# Patient Record
Sex: FEMALE | Race: AMERICAN INDIAN OR ALASKA NATIVE | Employment: OTHER | URBAN - NONMETROPOLITAN AREA
[De-identification: names, ages, dates, MRNs, and addresses within clinical notes are randomized per-mention and may not be internally consistent; named-entity substitution may affect disease eponyms.]

---

## 2021-06-27 ENCOUNTER — HOSPITAL ENCOUNTER (EMERGENCY)
Facility: HOSPITAL | Age: 61
Discharge: HOME | End: 2021-06-27
Attending: NURSE PRACTITIONER | Admitting: NURSE PRACTITIONER
Payer: MEDICARE

## 2021-06-27 VITALS — TEMPERATURE: 98.7 F | HEART RATE: 80 BPM | OXYGEN SATURATION: 97 % | RESPIRATION RATE: 16 BRPM

## 2021-06-27 DIAGNOSIS — H92.02 OTALGIA, LEFT: ICD-10-CM

## 2021-06-27 DIAGNOSIS — R50.9 FEVER: ICD-10-CM

## 2021-06-27 DIAGNOSIS — J01.90 ACUTE SINUSITIS: ICD-10-CM
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
## 2021-06-27 DIAGNOSIS — S20.362A TICK BITE OF LEFT SIDE OF CHEST WALL, INITIAL ENCOUNTER: ICD-10-CM

## 2021-06-27 DIAGNOSIS — L03.313 CELLULITIS OF CHEST WALL: Primary | ICD-10-CM

## 2021-06-27 PROCEDURE — 99214 OFFICE O/P EST MOD 30 MIN: CPT | Performed by: NURSE PRACTITIONER

## 2021-06-27 PROCEDURE — G0463 HOSPITAL OUTPT CLINIC VISIT: HCPCS

## 2021-06-27 RX ORDER — DOXYCYCLINE HYCLATE 100 MG
100 TABLET ORAL 2 TIMES DAILY
Qty: 28 TABLET | Refills: 0 | Status: SHIPPED | OUTPATIENT
Start: 2021-06-27 | End: 2021-07-11

## 2021-06-27 ASSESSMENT — ENCOUNTER SYMPTOMS
DIZZINESS: 0
COUGH: 0
SINUS PAIN: 0
HEADACHES: 1
SHORTNESS OF BREATH: 0
LIGHT-HEADEDNESS: 0
APPETITE CHANGE: 0
NAUSEA: 0
WOUND: 1
DIARRHEA: 0
VOMITING: 0
WEAKNESS: 0
FEVER: 1
SINUS PRESSURE: 0
NUMBNESS: 0
COLOR CHANGE: 1
MYALGIAS: 1

## 2021-06-27 NOTE — DISCHARGE INSTRUCTIONS
Take antibiotic as prescribed until finished.    Take Tylenol or ibuprofen as needed for pain or fever.    Follow-up with your doctor as needed if no improvement in your symptoms.    Return to emergency department for any concerning symptoms.

## 2021-06-27 NOTE — ED PROVIDER NOTES
History     Chief Complaint   Patient presents with     Sinusitis     Fever     Tick Bite     HPI  Laurence Dangelo is a 61 year old female who presents ambulatory to urgent care for multiple complaints. Patient tells me that she has had a frontal headache, left ear pain, generalized body aches and fever for 5 days. Fevers of up to   F. Reports history of seasonal allergies and has been taking her allergy medication and using nasal sprays with minimal improvements. Headache is primarily to the left side of her head. Additionally, patient notes that she has a reddened area to the left side of her chest. 3 weeks ago she pulled of a wood tick that she believes was only attached for a few hours. She notes that the area was healing up well. Sometime in the middle of the night she scratched to the area and now she thinks it is infected.    Denies shortness of breath, chest pain, nausea, vomiting or diarrhea. No rash to the rest of her body.    Patient declined to have a blood pressure taken during this visit.    Allergies:  Allergies   Allergen Reactions     Ciprofloxacin      Rash       Ether      nausea     Ezetimibe Other (See Comments)     Muscle aches and weak     Neomycin-Bacitracin-Polymyxin      Topical neosporin     Rosuvastatin Muscle Pain (Myalgia)     Pravastatin, lovastatin, atorvastatin, simvastatin also troubles       Problem List:    There are no active problems to display for this patient.       Past Medical History:    Past Medical History:   Diagnosis Date     Cervical cancer (H)      Diabetes type 2, controlled (H)      Lymphedema of left leg        Past Surgical History:    No past surgical history on file.    Family History:    No family history on file.    Social History:  Marital Status:   [2]  Social History     Tobacco Use     Smoking status: Never Smoker   Substance Use Topics     Alcohol use: Yes     Comment: rare     Drug use: No        Medications:    doxycycline hyclate  (VIBRA-TABS) 100 MG tablet  ASPIRIN PO  calcium citrate-vitamin D (CITRACAL) 315-200 MG-UNIT TABS  HYDROcodone-acetaminophen (NORCO) 5-325 MG per tablet  LISINOPRIL PO  METFORMIN HCL PO  multivitamin, therapeutic with minerals (MULTI-VITAMIN) TABS  NIACIN CR PO  Omega-3 Fatty Acids (OMEGA-3 FISH OIL PO)          Review of Systems   Constitutional: Positive for fever. Negative for appetite change.   HENT: Positive for ear pain. Negative for ear discharge, sinus pressure and sinus pain.    Eyes: Negative for visual disturbance.   Respiratory: Negative for cough and shortness of breath.    Gastrointestinal: Negative for diarrhea, nausea and vomiting.   Musculoskeletal: Positive for myalgias.   Skin: Positive for color change and wound.   Neurological: Positive for headaches. Negative for dizziness, weakness, light-headedness and numbness.   All other systems reviewed and are negative.      Physical Exam   BP: (pt refused BP)  Pulse: 80  Temp: 98.7  F (37.1  C)  Resp: 16  SpO2: 97 %      Physical Exam  Vitals signs and nursing note reviewed.   Constitutional:       Appearance: Normal appearance. She is not ill-appearing or toxic-appearing.   HENT:      Head: Normocephalic and atraumatic.      Right Ear: Tympanic membrane and ear canal normal.      Nose: Nose normal.      Mouth/Throat:      Mouth: Mucous membranes are moist.   Eyes:      Extraocular Movements: Extraocular movements intact.      Pupils: Pupils are equal, round, and reactive to light.   Neck:      Musculoskeletal: Normal range of motion.   Cardiovascular:      Rate and Rhythm: Normal rate and regular rhythm.      Heart sounds: Normal heart sounds.   Pulmonary:      Effort: Pulmonary effort is normal. No respiratory distress.      Breath sounds: Normal breath sounds.   Musculoskeletal: Normal range of motion.   Skin:     General: Skin is warm.      Capillary Refill: Capillary refill takes less than 2 seconds.      Findings: Erythema present.              Comments: Approximately 3 x 1 inch erythematous area to the left side of the chest. No central clearing. No fluctuance.   Neurological:      General: No focal deficit present.      Mental Status: She is alert and oriented to person, place, and time.      GCS: GCS eye subscore is 4. GCS verbal subscore is 5. GCS motor subscore is 6.      Cranial Nerves: No cranial nerve deficit, dysarthria or facial asymmetry.      Sensory: Sensation is intact. No sensory deficit.      Motor: No weakness or pronator drift.      Coordination: Coordination is intact. Coordination normal.      Gait: Gait normal.         ED Course        Procedures               No results found for this or any previous visit (from the past 24 hour(s)).    Medications - No data to display    Assessments & Plan (with Medical Decision Making)     I have reviewed the nursing notes.    I have reviewed the findings, diagnosis, plan and need for follow up with the patient.    New Prescriptions    DOXYCYCLINE HYCLATE (VIBRA-TABS) 100 MG TABLET    Take 1 tablet (100 mg) by mouth 2 times daily for 14 days       Final diagnoses:   Cellulitis of chest wall   Tick bite of left side of chest wall, initial encounter   Acute sinusitis   Otalgia, left   Fever   Offered to test for Lyme disease or tick borne illness testing, patient declined. Patient will be treated with doxycycline which should cover for cellulitis of her chest wall, tick bite and sinusitis. Recommended she continue taking Tylenol/ibuprofen as needed for pain or fever. Close follow-up with PCP if no improvement in symptoms. Return to ED/UC for any concerning symptoms. Patient verbalized understanding.    This document was prepared using a combination of typing and voice generated software.  While every attempt was made for accuracy, spelling and grammatical errors may exist.    6/27/2021   [facility]     Mpofu, Prudence, CNP  06/30/21 [id number]

## 2021-06-27 NOTE — ED TRIAGE NOTES
Pt presents low grade fever since Wednesday night. Left ear pain and sinus pressure.  Had tic bite 3 weeks ago and healed up and pt scratched and now the area is reddened.

## 2022-06-24 ENCOUNTER — HOSPITAL ENCOUNTER (EMERGENCY)
Facility: HOSPITAL | Age: 62
Discharge: HOME | End: 2022-06-24
Attending: NURSE PRACTITIONER | Admitting: NURSE PRACTITIONER
Payer: MEDICARE

## 2022-06-24 VITALS
DIASTOLIC BLOOD PRESSURE: 86 MMHG | RESPIRATION RATE: 18 BRPM | TEMPERATURE: 98.7 F | OXYGEN SATURATION: 97 % | HEART RATE: 80 BPM | SYSTOLIC BLOOD PRESSURE: 151 MMHG

## 2022-06-24 DIAGNOSIS — L03.116 CELLULITIS OF LEFT LOWER EXTREMITY: ICD-10-CM

## 2022-06-24 PROCEDURE — 99213 OFFICE O/P EST LOW 20 MIN: CPT | Performed by: NURSE PRACTITIONER

## 2022-06-24 PROCEDURE — G0463 HOSPITAL OUTPT CLINIC VISIT: HCPCS

## 2022-06-24 RX ORDER — CEPHALEXIN 500 MG/1
500 CAPSULE ORAL 4 TIMES DAILY
Qty: 20 CAPSULE | Refills: 0 | Status: SHIPPED | OUTPATIENT
Start: 2022-06-24 | End: 2022-06-29

## 2022-06-24 ASSESSMENT — ENCOUNTER SYMPTOMS
FATIGUE: 1
LIGHT-HEADEDNESS: 0
SHORTNESS OF BREATH: 0
DIARRHEA: 1
APPETITE CHANGE: 1
HEADACHES: 0
CHILLS: 1
ACTIVITY CHANGE: 1
COLOR CHANGE: 1
DIZZINESS: 0
FEVER: 1
MYALGIAS: 1

## 2022-06-24 NOTE — DISCHARGE INSTRUCTIONS
-Increase fluids.   -Complete all antibiotics even if feeling better.    -Taking antibiotics with food may decrease the symptoms, of an upset stomach, that can occur when taking antibiotics. Antibiotics frequently cause diarrhea. Probiotics or yogurt may help prevent or decrease these symptoms.   -Return to be reevaluated if symptoms do not improve, or worsen.    Return to ER or call 911 for shortness of breath, chest pain, or development of stroke like symptoms (facial droop, unexpected or increased weakness, confusion, or difficulty with speech).

## 2022-06-24 NOTE — ED TRIAGE NOTES
Patient has redness on her left leg, by the knee. Redness started last night. Achy, fever. States typical signs of cellulitis, for her

## 2022-06-24 NOTE — ED PROVIDER NOTES
History     Chief Complaint   Patient presents with     cellulitus     HPI  Laurence Dangelo is a 61 year old female who presents with left lower leg aching and redness that started 4 days ago.  Accompanied with decreased appetite, chills, fatigue, fever, and diarrhea that has now subsided.  Had a tick bite 2 weeks ago and was treated with doxycycline, at that time.  Was told to watch for cellulitis as has a history of cellulitis and cellulitis from tick bites.  Took ibuprofen at 2 AM this morning with mild relief of discomfort.  Quit smoking 1988.  Does not feel this is a blood clot as she has a history of cellulitis and this feels the same.  Denies nausea, vomiting, shortness of breath, headaches, dizziness, and lightheadedness.    Allergies:  Allergies   Allergen Reactions     Ciprofloxacin      Rash       Ether      nausea     Ezetimibe Other (See Comments)     Muscle aches and weak     Neomycin-Bacitracin-Polymyxin      Topical neosporin     Rosuvastatin Muscle Pain (Myalgia)     Pravastatin, lovastatin, atorvastatin, simvastatin also troubles       Problem List:    There are no problems to display for this patient.       Past Medical History:    Past Medical History:   Diagnosis Date     Cervical cancer (H)      Diabetes type 2, controlled (H)      Lymphedema of left leg        Past Surgical History:    History reviewed. No pertinent surgical history.    Family History:    History reviewed. No pertinent family history.    Social History:  Marital Status:   [2]  Social History     Tobacco Use     Smoking status: Never Smoker     Smokeless tobacco: Never Used   Substance Use Topics     Alcohol use: Yes     Comment: rare     Drug use: No        Medications:    ASPIRIN PO  cephALEXin (KEFLEX) 500 MG capsule  LISINOPRIL PO  METFORMIN HCL PO  multivitamin w/minerals (THERA-VIT-M) tablet  Omega-3 Fatty Acids (OMEGA-3 FISH OIL PO)          Review of Systems   Constitutional: Positive for activity change,  appetite change, chills, fatigue and fever.   Respiratory: Negative for shortness of breath.    Gastrointestinal: Positive for diarrhea (none in past 24 hours).   Musculoskeletal: Positive for myalgias.        Left lower leg aching and red and swelling. Has lymphedema in left leg    Skin: Positive for color change.   Neurological: Negative for dizziness, light-headedness and headaches.       Physical Exam   BP: 151/86  Pulse: 80  Temp: 98.7  F (37.1  C)  Resp: 18  SpO2: 97 %      Physical Exam  Vitals and nursing note reviewed.   Constitutional:       General: She is in acute distress (Mild to moderate).      Appearance: She is overweight.   Cardiovascular:      Rate and Rhythm: Normal rate and regular rhythm.      Pulses:           Dorsalis pedis pulses are 3+ on the left side.        Posterior tibial pulses are 3+ on the left side.      Heart sounds: Normal heart sounds. No murmur heard.  Pulmonary:      Effort: Pulmonary effort is normal. No respiratory distress.      Breath sounds: Normal breath sounds. No stridor. No wheezing, rhonchi or rales.   Musculoskeletal:         General: Swelling (Mild left lower leg) and tenderness present.        Legs:    Feet:      Left foot:      Skin integrity: Skin integrity normal.   Skin:     General: Skin is warm and dry.      Capillary Refill: Capillary refill takes less than 2 seconds.      Findings: Erythema present.   Neurological:      Mental Status: She is alert and oriented to person, place, and time.         ED Course                 Procedures           No results found for this or any previous visit (from the past 24 hour(s)).    Medications - No data to display    Assessments & Plan (with Medical Decision Making)     I have reviewed the nursing notes.    I have reviewed the findings, diagnosis, plan and need for follow up with the patient.  (L03.116) Cellulitis of left lower extremity  Comment: 61 year old female who presents with left lower leg aching and redness  that started 4 days ago.  Accompanied with decreased appetite, chills, fatigue, fever, and diarrhea that has now subsided.  Had a tick bite 2 weeks ago and was treated with doxycycline, at that time.  Was told to watch for cellulitis as has a history of cellulitis and cellulitis from tick bites.  Took ibuprofen at 2 AM this morning with mild relief of discomfort.  Quit smoking 1988.  Does not feel this is a blood clot as she has a history of cellulitis and this feels the same.  Denies nausea, vomiting, shortness of breath, headaches, dizziness, and lightheadedness.    MDM: NHT. Lungs CTA  Left, lateral, proximal, lower leg has 11 x 11 cm region of erythema.  Has lymphedema in this leg and is always swollen.  Is been mildly larger than right lower leg.  Upper leg is mottled and she states this is normal for her.  Popliteal pulse obtained with Doppler.  Pedal pulses 3+.    Does not wish to have ultrasound completed at this time as has history of cellulitis and lymphedema left leg.    Plan: Cephalexin 4 times daily for 5 days.  Education provided and/or discussed for this/these medication and cellulitis.  -Increase fluids.   -Complete all antibiotics even if feeling better.    -Taking antibiotics with food may decrease the symptoms, of an upset stomach, that can occur when taking antibiotics. Antibiotics frequently cause diarrhea. Probiotics or yogurt may help prevent or decrease these symptoms.   -Return to be reevaluated if symptoms do not improve, or worsen.    Return to ER or call 911 for shortness of breath, chest pain, or development of stroke like symptoms (facial droop, unexpected or increased weakness, confusion, or difficulty with speech).  These discharge instructions and medications were reviewed with her and understanding verbalized.    This document was prepared using a combination of typing and voice generated software.  While every attempt was made for accuracy, spelling and grammatical errors may  exist.    Discharge Medication List as of 6/24/2022 10:33 AM      START taking these medications    Details   cephALEXin (KEFLEX) 500 MG capsule Take 1 capsule (500 mg) by mouth 4 times daily for 5 days, Disp-20 capsule, R-0, E-Prescribe             Final diagnoses:   Cellulitis of left lower extremity       6/24/2022   [facility]       Gisella Wheat, KYLAH  06/24/22 [id number]

## 2022-06-24 NOTE — ED TRIAGE NOTES
Pt presents with redness to her left lower leg and is making her left thigh feel overworked. She thinks this started on Wednesday.

## 2022-12-04 ENCOUNTER — HOSPITAL ENCOUNTER (EMERGENCY)
Facility: HOSPITAL | Age: 62
Discharge: HOME | End: 2022-12-04
Attending: NURSE PRACTITIONER | Admitting: NURSE PRACTITIONER
Payer: MEDICARE

## 2022-12-04 VITALS
DIASTOLIC BLOOD PRESSURE: 82 MMHG | RESPIRATION RATE: 16 BRPM | WEIGHT: 246.91 LBS | OXYGEN SATURATION: 97 % | HEART RATE: 61 BPM | SYSTOLIC BLOOD PRESSURE: 154 MMHG | TEMPERATURE: 97.3 F

## 2022-12-04 DIAGNOSIS — L03.116 CELLULITIS OF LEFT LOWER LEG: Primary | ICD-10-CM

## 2022-12-04 PROCEDURE — 99213 OFFICE O/P EST LOW 20 MIN: CPT | Performed by: NURSE PRACTITIONER

## 2022-12-04 PROCEDURE — G0463 HOSPITAL OUTPT CLINIC VISIT: HCPCS

## 2022-12-04 RX ORDER — CEPHALEXIN 500 MG/1
500 CAPSULE ORAL 4 TIMES DAILY
Qty: 28 CAPSULE | Refills: 0 | Status: SHIPPED | OUTPATIENT
Start: 2022-12-04 | End: 2022-12-11

## 2022-12-04 ASSESSMENT — ENCOUNTER SYMPTOMS
MYALGIAS: 0
PSYCHIATRIC NEGATIVE: 1
DIARRHEA: 0
FEVER: 1
COLOR CHANGE: 1
CHILLS: 1
SHORTNESS OF BREATH: 0
VOMITING: 0
ARTHRALGIAS: 0
NAUSEA: 0

## 2022-12-04 NOTE — DISCHARGE INSTRUCTIONS
Cephalexin as ordered  - Take entire course of antibiotic even if you start to feel better.  - Antibiotics can cause stomach upset including nausea and diarrhea. Read your bottle or ask the pharmacist if antibiotic can be taken with food to help prevent nausea. If you have symptoms of diarrhea you can take an over-the-counter probiotic and/or increase foods with probiotics such as yogurt, [place], sauerkraut.    Alternate tylenol and ibuprofen as needed for pain    Follow up with primary care provider or return to Urgent Care/ED with any worsening in condition or additional concerns.

## 2022-12-04 NOTE — ED TRIAGE NOTES
Patient presents to urgent care with c/o cellulitis on her lower left leg. States it started on Thursday. Also has been having low grade fevers and chills. Redness stated this morning. Took tylenol at 2 am. Warm to the touch and states she has a history of this happening. Pain of 5/10 right now.

## 2022-12-04 NOTE — ED TRIAGE NOTES
61 y/o female presents with reports of erythema to her left lower leg, which began on Thursday. She also reports subjective fevers and chills.

## 2022-12-04 NOTE — ED PROVIDER NOTES
"  History     Chief Complaint   Patient presents with     Cellulitis     HPI  Laurence Dangelo is a 62 year old female who presents to urgent care today with complaints of cellulitis to left lower extremity.  Patient states started Thursday.  History of cellulitis to left lower extremity, last occurrence being on 6/24/2022 and was treated with cephalexin, patient states cephalexin works well for cellulitis.  Pain currently 5/10, took tylenol at 0200 for pain which helped.  When asked about a fever patient states that her leg feels feverish, red and warm to the touch.  No other concerns.    Allergies:  Allergies   Allergen Reactions     Ciprofloxacin      Rash       Ether      nausea     Ezetimibe Other (See Comments)     Muscle aches and weak     Neomycin-Bacitracin-Polymyxin      Topical neosporin     Rosuvastatin Muscle Pain (Myalgia)     Pravastatin, lovastatin, atorvastatin, simvastatin also troubles       Problem List:    There are no problems to display for this patient.       Past Medical History:    Past Medical History:   Diagnosis Date     Cervical cancer (H)      Diabetes type 2, controlled (H)      Lymphedema of left leg        Past Surgical History:    No past surgical history on file.    Family History:    No family history on file.    Social History:  Marital Status:   [2]  Social History     Tobacco Use     Smoking status: Never     Smokeless tobacco: Never   Substance Use Topics     Alcohol use: Yes     Comment: rare     Drug use: No        Medications:    ASPIRIN PO  cephALEXin (KEFLEX) 500 MG capsule  LISINOPRIL PO  METFORMIN HCL PO  multivitamin w/minerals (THERA-VIT-M) tablet  Omega-3 Fatty Acids (OMEGA-3 FISH OIL PO)      Review of Systems   Constitutional: Positive for chills (denies currently) and fever (\"leg feels feverish\").   Respiratory: Negative for shortness of breath.    Cardiovascular: Negative for chest pain.   Gastrointestinal: Negative for diarrhea, nausea and vomiting. "   Musculoskeletal: Negative for arthralgias, gait problem and myalgias.   Skin: Positive for color change (cellulitis left lower extremity).   Psychiatric/Behavioral: Negative.      Physical Exam   BP: 154/82  Pulse: 61  Temp: 97.3  F (36.3  C)  Resp: 16  Weight: 112 kg (246 lb 14.6 oz)  SpO2: 97 %    Physical Exam  Vitals and nursing note reviewed.   Constitutional:       General: She is not in acute distress.     Appearance: Normal appearance. She is not ill-appearing or toxic-appearing.   Cardiovascular:      Rate and Rhythm: Normal rate and regular rhythm.      Pulses: Normal pulses.      Heart sounds: Normal heart sounds.   Pulmonary:      Effort: Pulmonary effort is normal.      Breath sounds: Normal breath sounds.   Musculoskeletal:      Left knee: Normal.      Left lower leg: Tenderness present. No swelling, deformity, lacerations or bony tenderness. No edema.      Left ankle: Normal.      Left foot: Normal.   Skin:     General: Skin is warm and dry.      Capillary Refill: Capillary refill takes less than 2 seconds.      Findings: Erythema (cellulitis to left lower extremity which is red and warm to the touch.  No open skin wounds.  No signs of abscess.  Area outlined in surgical pen.) present. No abscess, bruising, ecchymosis, rash or wound.   Neurological:      Mental Status: She is alert.   Psychiatric:         Mood and Affect: Mood normal.           ED Course     No results found for this or any previous visit (from the past 24 hour(s)).    Medications - No data to display    Assessments & Plan (with Medical Decision Making)     I have reviewed the nursing notes.    I have reviewed the findings, diagnosis, plan and need for follow up with the patient.  (L03.116) Cellulitis of left lower leg  (primary encounter diagnosis)  Plan:   Patient ambulatory with a nontoxic appearance.  Cellulitis to left lower extremity.  No signs of abscess.  No open skin wounds.  History of cellulitis and cephalexin has worked  well.  Outlined area in surgical pen.  Cephalexin ordered.  Alternate Tylenol and ibuprofen as needed for pain.  Follow-up with primary care provider or return to urgent care/ED with any worsening in condition or additional concerns.  Patient is in agreement with treatment plan.    Discharge Medication List as of 12/4/2022 10:21 AM      START taking these medications    Details   cephALEXin (KEFLEX) 500 MG capsule Take 1 capsule (500 mg) by mouth 4 times daily for 7 days, Disp-28 capsule, R-0, E-Prescribe           Final diagnoses:   Cellulitis of left lower leg     12/4/2022   [facility]     Sejal Funes, DALE  12/04/22 1043

## 2025-04-29 ENCOUNTER — APPOINTMENT (OUTPATIENT)
Dept: ULTRASOUND IMAGING | Facility: HOSPITAL | Age: 65
End: 2025-04-29
Attending: EMERGENCY MEDICINE
Payer: MEDICARE

## 2025-04-29 ENCOUNTER — HOSPITAL ENCOUNTER (EMERGENCY)
Facility: HOSPITAL | Age: 65
Discharge: OTHER INSTITUTION | End: 2025-04-29
Attending: EMERGENCY MEDICINE
Payer: MEDICARE

## 2025-04-29 ENCOUNTER — APPOINTMENT (OUTPATIENT)
Dept: CT IMAGING | Facility: HOSPITAL | Age: 65
End: 2025-04-29
Attending: EMERGENCY MEDICINE
Payer: MEDICARE

## 2025-04-29 VITALS
OXYGEN SATURATION: 93 % | DIASTOLIC BLOOD PRESSURE: 78 MMHG | RESPIRATION RATE: 29 BRPM | TEMPERATURE: 97.5 F | HEART RATE: 68 BPM | SYSTOLIC BLOOD PRESSURE: 189 MMHG

## 2025-04-29 DIAGNOSIS — R60.0 PERIPHERAL EDEMA: ICD-10-CM

## 2025-04-29 DIAGNOSIS — J81.0 ACUTE PULMONARY EDEMA (H): ICD-10-CM

## 2025-04-29 DIAGNOSIS — R33.9 URINARY RETENTION WITH INCOMPLETE BLADDER EMPTYING: ICD-10-CM

## 2025-04-29 DIAGNOSIS — N31.9 NEUROGENIC BLADDER: ICD-10-CM

## 2025-04-29 DIAGNOSIS — N17.9 ACUTE KIDNEY INJURY: ICD-10-CM

## 2025-04-29 DIAGNOSIS — N13.30 BILATERAL HYDRONEPHROSIS: ICD-10-CM

## 2025-04-29 DIAGNOSIS — J90 PLEURAL EFFUSION ON RIGHT: ICD-10-CM

## 2025-04-29 LAB
ALBUMIN SERPL BCG-MCNC: 3.3 G/DL (ref 3.5–5.2)
ALBUMIN UR-MCNC: NEGATIVE MG/DL
ALP SERPL-CCNC: 84 U/L (ref 40–150)
ALT SERPL W P-5'-P-CCNC: 9 U/L (ref 0–50)
ANION GAP SERPL CALCULATED.3IONS-SCNC: 18 MMOL/L (ref 7–15)
APPEARANCE UR: CLEAR
AST SERPL W P-5'-P-CCNC: 20 U/L (ref 0–45)
BASE EXCESS BLDV CALC-SCNC: -4.5 MMOL/L (ref -3–3)
BASOPHILS # BLD AUTO: 0.1 10E3/UL (ref 0–0.2)
BASOPHILS NFR BLD AUTO: 1 %
BILIRUB SERPL-MCNC: 0.4 MG/DL
BILIRUB UR QL STRIP: NEGATIVE
BUN SERPL-MCNC: 52.1 MG/DL (ref 8–23)
CALCIUM SERPL-MCNC: 9.5 MG/DL (ref 8.8–10.4)
CHLORIDE SERPL-SCNC: 102 MMOL/L (ref 98–107)
COLOR UR AUTO: ABNORMAL
CREAT SERPL-MCNC: 5.43 MG/DL (ref 0.51–0.95)
CRP SERPL-MCNC: 172.53 MG/L
EGFRCR SERPLBLD CKD-EPI 2021: 8 ML/MIN/1.73M2
EOSINOPHIL # BLD AUTO: 0.2 10E3/UL (ref 0–0.7)
EOSINOPHIL NFR BLD AUTO: 2 %
ERYTHROCYTE [DISTWIDTH] IN BLOOD BY AUTOMATED COUNT: 14 % (ref 10–15)
GLUCOSE SERPL-MCNC: 161 MG/DL (ref 70–99)
GLUCOSE UR STRIP-MCNC: NEGATIVE MG/DL
HCO3 BLDV-SCNC: 21 MMOL/L (ref 21–28)
HCO3 SERPL-SCNC: 16 MMOL/L (ref 22–29)
HCT VFR BLD AUTO: 33 % (ref 35–47)
HGB BLD-MCNC: 11.1 G/DL (ref 11.7–15.7)
HGB UR QL STRIP: NEGATIVE
IMM GRANULOCYTES # BLD: 0.1 10E3/UL
IMM GRANULOCYTES NFR BLD: 1 %
KETONES UR STRIP-MCNC: NEGATIVE MG/DL
LACTATE SERPL-SCNC: 1.2 MMOL/L (ref 0.7–2)
LEUKOCYTE ESTERASE UR QL STRIP: NEGATIVE
LYMPHOCYTES # BLD AUTO: 0.8 10E3/UL (ref 0.8–5.3)
LYMPHOCYTES NFR BLD AUTO: 9 %
MAGNESIUM SERPL-MCNC: 2.4 MG/DL (ref 1.7–2.3)
MCH RBC QN AUTO: 28.7 PG (ref 26.5–33)
MCHC RBC AUTO-ENTMCNC: 33.6 G/DL (ref 31.5–36.5)
MCV RBC AUTO: 85 FL (ref 78–100)
MONOCYTES # BLD AUTO: 0.8 10E3/UL (ref 0–1.3)
MONOCYTES NFR BLD AUTO: 8 %
NEUTROPHILS # BLD AUTO: 7.6 10E3/UL (ref 1.6–8.3)
NEUTROPHILS NFR BLD AUTO: 80 %
NITRATE UR QL: NEGATIVE
NRBC # BLD AUTO: 0 10E3/UL
NRBC BLD AUTO-RTO: 0 /100
O2/TOTAL GAS SETTING VFR VENT: 21 %
OXYHGB MFR BLDV: 45 % (ref 70–75)
PCO2 BLDV: 40 MM HG (ref 40–50)
PH BLDV: 7.33 [PH] (ref 7.32–7.43)
PH UR STRIP: 6 [PH] (ref 4.7–8)
PLATELET # BLD AUTO: 281 10E3/UL (ref 150–450)
PO2 BLDV: 28 MM HG (ref 25–47)
POTASSIUM SERPL-SCNC: 4.3 MMOL/L (ref 3.4–5.3)
PROT SERPL-MCNC: 7.1 G/DL (ref 6.4–8.3)
RBC # BLD AUTO: 3.87 10E6/UL (ref 3.8–5.2)
RBC URINE: 0 /HPF
SAO2 % BLDV: 45.9 % (ref 70–75)
SODIUM SERPL-SCNC: 136 MMOL/L (ref 135–145)
SP GR UR STRIP: 1.01 (ref 1–1.03)
SQUAMOUS EPITHELIAL: 2 /HPF
TROPONIN T SERPL HS-MCNC: 22 NG/L
TROPONIN T SERPL HS-MCNC: 25 NG/L
UROBILINOGEN UR STRIP-MCNC: NORMAL MG/DL
WBC # BLD AUTO: 9.5 10E3/UL (ref 4–11)
WBC URINE: 1 /HPF

## 2025-04-29 PROCEDURE — 93005 ELECTROCARDIOGRAM TRACING: CPT

## 2025-04-29 PROCEDURE — 83735 ASSAY OF MAGNESIUM: CPT | Performed by: EMERGENCY MEDICINE

## 2025-04-29 PROCEDURE — 74176 CT ABD & PELVIS W/O CONTRAST: CPT

## 2025-04-29 PROCEDURE — 76705 ECHO EXAM OF ABDOMEN: CPT | Mod: 26 | Performed by: RADIOLOGY

## 2025-04-29 PROCEDURE — 71250 CT THORAX DX C-: CPT | Mod: 26 | Performed by: RADIOLOGY

## 2025-04-29 PROCEDURE — 93010 ELECTROCARDIOGRAM REPORT: CPT | Performed by: INTERNAL MEDICINE

## 2025-04-29 PROCEDURE — 36415 COLL VENOUS BLD VENIPUNCTURE: CPT | Performed by: EMERGENCY MEDICINE

## 2025-04-29 PROCEDURE — 99285 EMERGENCY DEPT VISIT HI MDM: CPT | Mod: 25

## 2025-04-29 PROCEDURE — 99285 EMERGENCY DEPT VISIT HI MDM: CPT | Performed by: EMERGENCY MEDICINE

## 2025-04-29 PROCEDURE — 76705 ECHO EXAM OF ABDOMEN: CPT

## 2025-04-29 PROCEDURE — 84484 ASSAY OF TROPONIN QUANT: CPT | Performed by: EMERGENCY MEDICINE

## 2025-04-29 PROCEDURE — 74176 CT ABD & PELVIS W/O CONTRAST: CPT | Mod: 26 | Performed by: RADIOLOGY

## 2025-04-29 PROCEDURE — 82565 ASSAY OF CREATININE: CPT | Performed by: EMERGENCY MEDICINE

## 2025-04-29 PROCEDURE — 86140 C-REACTIVE PROTEIN: CPT | Performed by: EMERGENCY MEDICINE

## 2025-04-29 PROCEDURE — 82805 BLOOD GASES W/O2 SATURATION: CPT | Performed by: EMERGENCY MEDICINE

## 2025-04-29 PROCEDURE — 81001 URINALYSIS AUTO W/SCOPE: CPT | Performed by: EMERGENCY MEDICINE

## 2025-04-29 PROCEDURE — 83605 ASSAY OF LACTIC ACID: CPT | Performed by: EMERGENCY MEDICINE

## 2025-04-29 PROCEDURE — 85025 COMPLETE CBC W/AUTO DIFF WBC: CPT | Performed by: EMERGENCY MEDICINE

## 2025-04-29 PROCEDURE — 51702 INSERT TEMP BLADDER CATH: CPT | Mod: XU

## 2025-04-29 ASSESSMENT — ACTIVITIES OF DAILY LIVING (ADL)
ADLS_ACUITY_SCORE: 41

## 2025-04-29 ASSESSMENT — COLUMBIA-SUICIDE SEVERITY RATING SCALE - C-SSRS
1. IN THE PAST MONTH, HAVE YOU WISHED YOU WERE DEAD OR WISHED YOU COULD GO TO SLEEP AND NOT WAKE UP?: NO
2. HAVE YOU ACTUALLY HAD ANY THOUGHTS OF KILLING YOURSELF IN THE PAST MONTH?: NO
6. HAVE YOU EVER DONE ANYTHING, STARTED TO DO ANYTHING, OR PREPARED TO DO ANYTHING TO END YOUR LIFE?: NO

## 2025-04-29 NOTE — ED TRIAGE NOTES
Patient presents from clinic for abnormal labs. Reports current sx of right sided abd and flank pain, as well as BLE edema.

## 2025-04-29 NOTE — ED PROVIDER NOTES
History     Chief Complaint   Patient presents with    Abnormal Labs     HPI  Laurence Dangelo is a 64 year old female who has a history of diabetes mellitus type 2 morbid obesity, previous cervical cancer and chronic lymphedema of the left leg post lymph node dissection 2001, presents from clinic at [facility] for acute kidney injury.  Patient was seen April 18 for constipation and was prescribed MiraLAX solution which she been taking daily.  She is having about 6 loose to watery stools a day for the last week.  She has been taking ibuprofen 800 mg twice a day for the last 3 to 4 days for chronic back pain.  Patient's spouse and the patient noticed that she has developed increased peripheral edema, facial edema over the last 2 to 3 days.  Some increased exertional dyspnea.  She denies any dyspnea at rest.  Denies any chest pains palpitations presyncope or diaphoresis.  No nausea or vomiting.  She has been producing some urine.  Patient's creatinine at the clinic was 5.8 with a BUN in the 50s, apparently about a week or 2 ago it was normal.  Chest x-ray showed some pulmonary venous congestion  Patient noted when she was seen for constipation on the 18th she was having mainly left lower quadrant pain and now is more right upper quadrant right lower quadrant abdominal pain today with some right flank discomfort.  Allergies:  Allergies   Allergen Reactions    Ciprofloxacin      Rash      Ether      nausea    Ezetimibe Other (See Comments)     Muscle aches and weak    Neomycin-Bacitracin-Polymyxin      Topical neosporin    Rosuvastatin Muscle Pain (Myalgia)     Pravastatin, lovastatin, atorvastatin, simvastatin also troubles       Problem List:    There are no active problems to display for this patient.       Past Medical History:    Past Medical History:   Diagnosis Date    Cervical cancer (H)     Diabetes type 2, controlled (H)     Lymphedema of left leg        Past Surgical History:    No past surgical history on  file.    Family History:    No family history on file.    Social History:  Marital Status:   [2]  Social History     Tobacco Use    Smoking status: Never    Smokeless tobacco: Never   Substance Use Topics    Alcohol use: Yes     Comment: rare    Drug use: No        Medications:    ASPIRIN PO  LISINOPRIL PO  METFORMIN HCL PO  multivitamin w/minerals (THERA-VIT-M) tablet  Omega-3 Fatty Acids (OMEGA-3 FISH OIL PO)          Review of Systems   All other systems reviewed and are negative.      Physical Exam   BP: (!) 173/85  Pulse: 73  Temp: 97.5  F (36.4  C)  Resp: 18  SpO2: 96 %      Physical Exam  Vitals and nursing note reviewed.   Constitutional:       General: She is not in acute distress.     Appearance: Normal appearance. She is obese. She is not ill-appearing, toxic-appearing or diaphoretic.   HENT:      Head: Normocephalic and atraumatic.      Right Ear: External ear normal.      Left Ear: External ear normal.      Nose: Nose normal.      Mouth/Throat:      Mouth: Mucous membranes are moist.      Pharynx: Oropharynx is clear. No oropharyngeal exudate or posterior oropharyngeal erythema.   Eyes:      General: No scleral icterus.     Extraocular Movements: Extraocular movements intact.      Conjunctiva/sclera: Conjunctivae normal.   Cardiovascular:      Rate and Rhythm: Normal rate and regular rhythm.      Pulses: Normal pulses.      Heart sounds: Normal heart sounds.   Pulmonary:      Effort: Pulmonary effort is normal. No respiratory distress.      Breath sounds: Normal breath sounds.      Comments: Diminished breath sounds at bases bilaterally worse on the right  Abdominal:      General: Abdomen is flat. Bowel sounds are normal.      Palpations: Abdomen is soft.   Musculoskeletal:         General: Normal range of motion.      Cervical back: Normal range of motion and neck supple.      Right lower leg: Edema present.      Left lower leg: Edema present.   Skin:     General: Skin is warm and dry.       Capillary Refill: Capillary refill takes less than 2 seconds.      Findings: No rash.   Neurological:      General: No focal deficit present.      Mental Status: She is alert and oriented to person, place, and time. Mental status is at baseline.   Psychiatric:         Mood and Affect: Mood normal.         Behavior: Behavior normal.         Thought Content: Thought content normal.         Judgment: Judgment normal.         ED Course     ED Course as of 04/29/25 1728   Tue Apr 29, 2025 1726 Post void bladder scan was unable to measure given the large volume of urine Avalos catheter placed she quickly drained a liter we clamped it at this point and we will open it again later   1727 Discussed case with Dr. Hylton who accepted patient for transfer to urology bed     Procedures              Critical Care time:  none     None       EKG done at 1318 reviewed at 1318 agree with below interpretation  Results for orders placed or performed during the hospital encounter of 04/29/25 (from the past 24 hours)   EKG 12-lead, tracing only   Result Value Ref Range    Systolic Blood Pressure  mmHg    Diastolic Blood Pressure  mmHg    Ventricular Rate 72 BPM    Atrial Rate 72 BPM    RI Interval 130 ms    QRS Duration 74 ms     ms    QTc 413 ms    P Axis 72 degrees    R AXIS 49 degrees    T Axis 19 degrees    Interpretation ECG       Sinus rhythm  Cannot rule out Anterior infarct , age undetermined  Abnormal ECG  When compared with ECG of 24-Sep-2001 07:08,  No significant change was found     CBC with platelets differential    Narrative    The following orders were created for panel order CBC with platelets differential.  Procedure                               Abnormality         Status                     ---------                               -----------         ------                     CBC with platelets and ...[[id number]]  Abnormal            Final result                 Please view results for these tests on the  individual orders.   Comprehensive metabolic panel   Result Value Ref Range    Sodium 136 135 - 145 mmol/L    Potassium 4.3 3.4 - 5.3 mmol/L    Carbon Dioxide (CO2) 16 (L) 22 - 29 mmol/L    Anion Gap 18 (H) 7 - 15 mmol/L    Urea Nitrogen 52.1 (H) 8.0 - 23.0 mg/dL    Creatinine 5.43 (H) 0.51 - 0.95 mg/dL    GFR Estimate 8 (L) >60 mL/min/1.73m2    Calcium 9.5 8.8 - 10.4 mg/dL    Chloride 102 98 - 107 mmol/L    Glucose 161 (H) 70 - 99 mg/dL    Alkaline Phosphatase 84 40 - 150 U/L    AST 20 0 - 45 U/L    ALT 9 0 - 50 U/L    Protein Total 7.1 6.4 - 8.3 g/dL    Albumin 3.3 (L) 3.5 - 5.2 g/dL    Bilirubin Total 0.4 <=1.2 mg/dL   Lactic acid whole blood with 1x repeat in 2 hr when >2   Result Value Ref Range    Lactic Acid, Initial 1.2 0.7 - 2.0 mmol/L   Troponin T, High Sensitivity   Result Value Ref Range    Troponin T, High Sensitivity 22 (H) <=14 ng/L   Magnesium   Result Value Ref Range    Magnesium 2.4 (H) 1.7 - 2.3 mg/dL   Blood gas venous   Result Value Ref Range    pH Venous 7.33 7.32 - 7.43    pCO2 Venous 40 40 - 50 mm Hg    pO2 Venous 28 25 - 47 mm Hg    Bicarbonate Venous 21 21 - 28 mmol/L    Base Excess/Deficit Venous -4.5 (L) -3.0 - 3.0 mmol/L    FIO2 21     Oxyhemoglobin Venous 45 (L) 70 - 75 %    O2 Sat, Venous 45.9 (L) 70.0 - 75.0 %    Narrative    In healthy individuals, oxyhemoglobin (O2Hb) and oxygen saturation (SO2) are approximately equal. In the presence of dyshemoglobins, oxyhemoglobin can be considerably lower than oxygen saturation.   CRP inflammation   Result Value Ref Range    CRP Inflammation 172.53 (H) <5.00 mg/L   CBC with platelets and differential   Result Value Ref Range    WBC Count 9.5 4.0 - 11.0 10e3/uL    RBC Count 3.87 3.80 - 5.20 10e6/uL    Hemoglobin 11.1 (L) 11.7 - 15.7 g/dL    Hematocrit 33.0 (L) 35.0 - 47.0 %    MCV 85 78 - 100 fL    MCH 28.7 26.5 - 33.0 pg    MCHC 33.6 31.5 - 36.5 g/dL    RDW 14.0 10.0 - 15.0 %    Platelet Count 281 150 - 450 10e3/uL    % Neutrophils 80 %    %  Lymphocytes 9 %    % Monocytes 8 %    % Eosinophils 2 %    % Basophils 1 %    % Immature Granulocytes 1 %    NRBCs per 100 WBC 0 <1 /100    Absolute Neutrophils 7.6 1.6 - 8.3 10e3/uL    Absolute Lymphocytes 0.8 0.8 - 5.3 10e3/uL    Absolute Monocytes 0.8 0.0 - 1.3 10e3/uL    Absolute Eosinophils 0.2 0.0 - 0.7 10e3/uL    Absolute Basophils 0.1 0.0 - 0.2 10e3/uL    Absolute Immature Granulocytes 0.1 <=0.4 10e3/uL    Absolute NRBCs 0.0 10e3/uL   UA with Microscopic reflex to Culture    Specimen: Urine, NOS   Result Value Ref Range    Color Urine Straw Colorless, Straw, Light Yellow, Yellow    Appearance Urine Clear Clear    Glucose Urine Negative Negative mg/dL    Bilirubin Urine Negative Negative    Ketones Urine Negative Negative mg/dL    Specific Gravity Urine 1.008 1.003 - 1.035    Blood Urine Negative Negative    pH Urine 6.0 4.7 - 8.0    Protein Albumin Urine Negative Negative mg/dL    Urobilinogen Urine Normal Normal mg/dL    Nitrite Urine Negative Negative    Leukocyte Esterase Urine Negative Negative    RBC Urine 0 <=2 /HPF    WBC Urine 1 <=5 /HPF    Squamous Epithelials Urine 2 (H) <=1 /HPF    Narrative    Urine Culture not indicated   US Abdomen Limited    Narrative    PROCEDURES: US ABDOMEN LIMITED    HISTORY: RUQ pain.    TECHNIQUE: Grayscale ultrasound of the upper abdomen was performed.    COMPARISON: None.     FINDINGS:    LIVER: The liver is echogenic with fatty sparing at gallbladder fossa.  Acoustic penetration is relatively preserved. No intrahepatic mass is  identified.    GALLBLADDER: There is a wall echo shadow sign. The common bile duct is  not well seen.    ABDOMINAL AORTA AND IVC: Portions of the superior IVC and abdominal  aorta are visualized.    PANCREAS: The visualized portions of the pancreas are unremarkable.    KIDNEYS: Moderate bilateral hydronephrosis.    OTHER: Small right pleural effusion.      Impression    IMPRESSION:     Bilateral hydronephrosis.    Right pleural  effusion.    Cholelithiasis.    Hepatic steatosis.    FRANCK ALVAREZ MD         SYSTEM ID:  [id number]   CT Chest Abdomen Pelvis w/o Contrast    Narrative    PROCEDURE: CT CHEST ABDOMEN PELVIS W/O CONTRAST 4/29/2025 3:43 PM    HISTORY: acute kidney injury, sob, right abd pain    COMPARISONS: None.    Meds/Dose Given:    TECHNIQUE: CT scan of the chest abdomen and pelvis without IV contrast  sagittal coronal reconstructions were obtained    FINDINGS: There is a moderate size right-sided pleural effusion. There  is interstitial thickening seen in both lungs. There is some  compressive atelectasis in the right lung base. The heart is normal in  size. The hilar and mediastinal lymph nodes are normal in caliber.  Axillary and supraclavicular lymph nodes are normal. Degenerative  changes are present in the thoracic spine.    The liver is free of masses or biliary ductal enlargement. There is  calcification in the gallbladder wall. Spleen and pancreas appear  normal. The adrenal glands are normal.    There is bilateral hydronephrosis worse on the right than the left.  There is significant edema and fluid seen in Gerota's fascia on the  right. The bladder is markedly distended extending upward to the  umbilicus.    The periaortic lymph nodes are normal in caliber.    No intraperitoneal masses or inflammatory changes are noted. There is  an umbilical hernia containing fat. The rectum appears normal.  Advanced degenerative changes are present in the lumbar spine. There  is spondylolisthesis of L4 on L5.         Impression    IMPRESSION: Right-sided pleural effusion with interstitial thickening  in both lungs suspicious for interstitial pulmonary edema    Markedly distended bladder with bilateral hydronephrosis worse on the  right than the left. There is significant edema in Gerota's fascia on  the right and the possibility of right-sided pyelonephritis should be  considered.    Calcified gallbladder wall.    JESSICA  MD TENA         SYSTEM ID:  [id number]   Troponin T, High Sensitivity   Result Value Ref Range    Troponin T, High Sensitivity 25 (H) <=14 ng/L       Medications - No data to display    Assessments & Plan (with Medical Decision Making)     I have reviewed the nursing notes.    I have reviewed the findings, diagnosis, plan and need for follow up with the patient.           Medical Decision Making  The patient's presentation was of high complexity (an acute health issue posing potential threat to life or bodily function).    The patient's evaluation involved:  ordering and/or review of 3+ test(s) in this encounter (EKG CT scan multiple lab investigation)    The patient's management necessitated high risk consideration for admission  The patient is a 64 year old year old female with a past medical history as above of who presents to the ED with a complaint of of peripheral edema elevated creatinine incomplete voiding of urine.  History was obtained from the patient.  Presentation combined with history increase my concerns for acute kidney injury/renal failure, fluid overload with pulmonary edema, pleural effusion, peripheral edema, congestive heart failure, hepatic failure, myocardial infarction, pyelonephritis, kidney stone with obstruction.  It was decided necessary to order ED investigations in order to properly assess patient's acute emergent complaint.  My independent interpretation of revealed and is in agreement with radiology interpretation showing ultrasound showed cholelithiasis calcified gallbladder wall bilateral hydronephrosis right pleural effusion, CT scan showed above as well with large urine filled bladder.  My independent review of EKG reveals sinus rhythm see above.  ED labs reveal elevated CRP creatinine BUN see above.  After prolonged ED observation interpretation of vital signs history physical ED studies feel the patient has likely neurogenic atonic bladder with bilateral hydronephrosis and  secondary acute kidney injury.  Avalos catheter is placed.  Shared decision-making was discussed in layman terms with the patient or caregiver and they agree with plan.  Patient will need transfer to facility with urology and nephrology inpatient care, discussed case with hospitalist Dr. Hylton  who accepted patient for transfer   New Prescriptions    No medications on file       Final diagnoses:   Acute kidney injury   Bilateral hydronephrosis   Neurogenic bladder   Pleural effusion on right   Acute pulmonary edema (H)   Peripheral edema   Urinary retention with incomplete bladder emptying       4/29/2025   [facility]       Pancho Perera MD  04/29/25 0006

## 2025-04-29 NOTE — ED NOTES
"Pt reports going to see her primary for \"swollen feet\" that hasn't improved. Pt has lymphedema in her left leg, and has swelling in bilateral ankle. There is also 2+ edema to her Bilateral feet. Pt is NOT SOB, but does complain of back and hip pain.   "

## 2025-05-01 LAB
ATRIAL RATE - MUSE: 72 BPM
DIASTOLIC BLOOD PRESSURE - MUSE: NORMAL MMHG
INTERPRETATION ECG - MUSE: NORMAL
P AXIS - MUSE: 72 DEGREES
PR INTERVAL - MUSE: 130 MS
QRS DURATION - MUSE: 74 MS
QT - MUSE: 378 MS
QTC - MUSE: 413 MS
R AXIS - MUSE: 49 DEGREES
SYSTOLIC BLOOD PRESSURE - MUSE: NORMAL MMHG
T AXIS - MUSE: 19 DEGREES
VENTRICULAR RATE- MUSE: 72 BPM

## 2025-05-13 ENCOUNTER — HOSPITAL ENCOUNTER (EMERGENCY)
Facility: HOSPITAL | Age: 65
Discharge: HOME | End: 2025-05-13
Attending: EMERGENCY MEDICINE
Payer: MEDICARE

## 2025-05-13 VITALS
SYSTOLIC BLOOD PRESSURE: 130 MMHG | HEART RATE: 66 BPM | RESPIRATION RATE: 16 BRPM | OXYGEN SATURATION: 97 % | DIASTOLIC BLOOD PRESSURE: 64 MMHG | TEMPERATURE: 97.4 F

## 2025-05-13 DIAGNOSIS — R31.0 GROSS HEMATURIA: ICD-10-CM

## 2025-05-13 LAB
ALBUMIN UR-MCNC: 30 MG/DL
APPEARANCE UR: CLEAR
BILIRUB UR QL STRIP: NEGATIVE
COLOR UR AUTO: ABNORMAL
GLUCOSE UR STRIP-MCNC: NEGATIVE MG/DL
HGB UR QL STRIP: ABNORMAL
KETONES UR STRIP-MCNC: NEGATIVE MG/DL
LEUKOCYTE ESTERASE UR QL STRIP: ABNORMAL
MUCOUS THREADS #/AREA URNS LPF: PRESENT /LPF
NITRATE UR QL: NEGATIVE
PH UR STRIP: 6.5 [PH] (ref 4.7–8)
RBC URINE: 154 /HPF
SP GR UR STRIP: 1.01 (ref 1–1.03)
SQUAMOUS EPITHELIAL: 0 /HPF
UROBILINOGEN UR STRIP-MCNC: NORMAL MG/DL
WBC URINE: 4 /HPF

## 2025-05-13 PROCEDURE — 99283 EMERGENCY DEPT VISIT LOW MDM: CPT

## 2025-05-13 PROCEDURE — 87088 URINE BACTERIA CULTURE: CPT | Performed by: EMERGENCY MEDICINE

## 2025-05-13 PROCEDURE — 81001 URINALYSIS AUTO W/SCOPE: CPT | Performed by: EMERGENCY MEDICINE

## 2025-05-13 PROCEDURE — 99283 EMERGENCY DEPT VISIT LOW MDM: CPT | Performed by: EMERGENCY MEDICINE

## 2025-05-13 ASSESSMENT — COLUMBIA-SUICIDE SEVERITY RATING SCALE - C-SSRS
6. HAVE YOU EVER DONE ANYTHING, STARTED TO DO ANYTHING, OR PREPARED TO DO ANYTHING TO END YOUR LIFE?: NO
2. HAVE YOU ACTUALLY HAD ANY THOUGHTS OF KILLING YOURSELF IN THE PAST MONTH?: NO
1. IN THE PAST MONTH, HAVE YOU WISHED YOU WERE DEAD OR WISHED YOU COULD GO TO SLEEP AND NOT WAKE UP?: NO

## 2025-05-13 ASSESSMENT — ACTIVITIES OF DAILY LIVING (ADL): ADLS_ACUITY_SCORE: 41

## 2025-05-13 NOTE — DISCHARGE INSTRUCTIONS
Increase fluids, dalia water, to keep the urine flowing.  You will be contacted if the urine culture shows you need an antibiotic.  Followup with urology tomorrow as scheduled.

## 2025-05-13 NOTE — ED PROVIDER NOTES
S: Pt is a 65 yo female who presents to the ED with CC of hematuria.  She had a carlton catheter placed 4/29/25.  Pt noticed some blood in the urine along with clots overnight last night.  The urine is already starting to clear now.  She has a cystoscopy scheduled in [place] for tomorrow.  Pt denies any abdominal/pelvic pain, F/C, or N/V.  She is not taking any blood thinners.      Complete multisystem ROS except as mentioned above negative.  Past Medical History:   Diagnosis Date    Cervical cancer (H)     s/p surgery, chemo and XRT    Diabetes type 2, controlled (H)     Lymphedema of left leg      There is no problem list on file for this patient.    No past surgical history on file.  No family history on file.  Social History     Tobacco Use    Smoking status: Never    Smokeless tobacco: Never   Substance Use Topics    Alcohol use: Yes     Comment: rare    Drug use: No     O:BP (!) 140/80   Pulse 84   Temp 97.4  F (36.3  C) (Tympanic)   Resp 16   SpO2 99%   Gen - pleasant, cooperative, in NAD  Neuro - A&O x 3, CN II-XII intact, gait is steady  HEENT - PERRLA bilaterally, EOMI bilaterally, no conjunctival injection  Abd - soft, obese, NT, nl BS, no distention  Extr - no LE edema  Skin - w/d/i    Results for orders placed or performed during the hospital encounter of 05/13/25   UA with Microscopic reflex to Culture     Status: Abnormal    Specimen: Urine, Carlton Catheter   Result Value Ref Range    Color Urine Light Yellow Colorless, Straw, Light Yellow, Yellow    Appearance Urine Clear Clear    Glucose Urine Negative Negative mg/dL    Bilirubin Urine Negative Negative    Ketones Urine Negative Negative mg/dL    Specific Gravity Urine 1.013 1.003 - 1.035    Blood Urine Large (A) Negative    pH Urine 6.5 4.7 - 8.0    Protein Albumin Urine 30 (A) Negative mg/dL    Urobilinogen Urine Normal Normal mg/dL    Nitrite Urine Negative Negative    Leukocyte Esterase Urine Moderate (A) Negative    Mucus Urine Present (A) None  Seen /LPF    RBC Urine 154 (H) <=2 /HPF    WBC Urine 4 <=5 /HPF    Squamous Epithelials Urine 0 <=1 /HPF    Narrative    Urine Culture ordered based on laboratory criteria     UC - pending    A:   1. Gross hematuria        P: Increase PO fluids, dalia water      UC is pending      F/U with urology tomorrow as scheduled     Lorena Bhagat DO  05/13/25 0849

## 2025-05-13 NOTE — ED TRIAGE NOTES
Patient had a catheter placed on 4/29. Was IN the [facility] for kidney issues. Catheter has been working well, noted blood in the urine around 1194-6644, with clots. Clearing up now.

## 2025-05-14 ENCOUNTER — RESULTS FOLLOW-UP (OUTPATIENT)
Dept: EMERGENCY MEDICINE | Facility: HOSPITAL | Age: 65
End: 2025-05-14

## 2025-05-15 LAB
BACTERIA UR CULT: ABNORMAL

## 2025-05-16 LAB
BACTERIA UR CULT: ABNORMAL
BACTERIA UR CULT: ABNORMAL

## 2025-07-21 ENCOUNTER — HOSPITAL ENCOUNTER (EMERGENCY)
Facility: HOSPITAL | Age: 65
Discharge: HOME | End: 2025-07-21
Payer: MEDICARE

## 2025-07-21 VITALS
HEART RATE: 85 BPM | DIASTOLIC BLOOD PRESSURE: 102 MMHG | SYSTOLIC BLOOD PRESSURE: 185 MMHG | RESPIRATION RATE: 18 BRPM | WEIGHT: 234.8 LBS | OXYGEN SATURATION: 96 % | TEMPERATURE: 99.1 F

## 2025-07-21 PROCEDURE — 99281 EMR DPT VST MAYX REQ PHY/QHP: CPT

## 2025-07-21 ASSESSMENT — COLUMBIA-SUICIDE SEVERITY RATING SCALE - C-SSRS
2. HAVE YOU ACTUALLY HAD ANY THOUGHTS OF KILLING YOURSELF IN THE PAST MONTH?: NO
1. IN THE PAST MONTH, HAVE YOU WISHED YOU WERE DEAD OR WISHED YOU COULD GO TO SLEEP AND NOT WAKE UP?: NO
6. HAVE YOU EVER DONE ANYTHING, STARTED TO DO ANYTHING, OR PREPARED TO DO ANYTHING TO END YOUR LIFE?: NO

## 2025-07-21 NOTE — ED TRIAGE NOTES
"\"I am having right upper abdominal pain, I think my gall bladder is acting up.  Nausea and vomiting for about 4-5 days.  My martinez hips and lower back have been aching more than usual.\"         "

## 2025-07-28 ENCOUNTER — APPOINTMENT (OUTPATIENT)
Dept: CT IMAGING | Facility: HOSPITAL | Age: 65
End: 2025-07-28
Attending: EMERGENCY MEDICINE
Payer: MEDICARE

## 2025-07-28 ENCOUNTER — HOSPITAL ENCOUNTER (EMERGENCY)
Facility: HOSPITAL | Age: 65
Discharge: HOME | End: 2025-07-28
Attending: EMERGENCY MEDICINE
Payer: MEDICARE

## 2025-07-28 VITALS
DIASTOLIC BLOOD PRESSURE: 96 MMHG | SYSTOLIC BLOOD PRESSURE: 155 MMHG | HEART RATE: 118 BPM | TEMPERATURE: 99.3 F | OXYGEN SATURATION: 98 % | RESPIRATION RATE: 16 BRPM

## 2025-07-28 DIAGNOSIS — R31.0 GROSS HEMATURIA: ICD-10-CM

## 2025-07-28 DIAGNOSIS — N17.9 ACUTE KIDNEY INJURY: ICD-10-CM

## 2025-07-28 DIAGNOSIS — R33.9 URINARY RETENTION: Primary | ICD-10-CM

## 2025-07-28 PROBLEM — J90 PLEURAL EFFUSION ON RIGHT: Status: ACTIVE | Noted: 2025-04-29

## 2025-07-28 PROBLEM — I89.0 LYMPHEDEMA OF BOTH LOWER EXTREMITIES: Status: ACTIVE | Noted: 2020-07-28

## 2025-07-28 LAB
ALBUMIN SERPL BCG-MCNC: 3.3 G/DL (ref 3.5–5.2)
ALBUMIN UR-MCNC: ABNORMAL G/DL
ALP SERPL-CCNC: 99 U/L (ref 40–150)
ALT SERPL W P-5'-P-CCNC: 17 U/L (ref 0–50)
ANION GAP SERPL CALCULATED.3IONS-SCNC: 14 MMOL/L (ref 7–15)
APPEARANCE UR: ABNORMAL
AST SERPL W P-5'-P-CCNC: 24 U/L (ref 0–45)
BASOPHILS # BLD AUTO: 0.1 10E3/UL (ref 0–0.2)
BASOPHILS NFR BLD AUTO: 1 %
BILIRUB SERPL-MCNC: 0.4 MG/DL
BILIRUB UR QL STRIP: ABNORMAL
BUN SERPL-MCNC: 28.4 MG/DL (ref 8–23)
CALCIUM SERPL-MCNC: 9.7 MG/DL (ref 8.8–10.4)
CHLORIDE SERPL-SCNC: 108 MMOL/L (ref 98–107)
COLOR UR AUTO: ABNORMAL
CREAT SERPL-MCNC: 2.68 MG/DL (ref 0.51–0.95)
EGFRCR SERPLBLD CKD-EPI 2021: 19 ML/MIN/1.73M2
EOSINOPHIL # BLD AUTO: 0.1 10E3/UL (ref 0–0.7)
EOSINOPHIL NFR BLD AUTO: 1 %
ERYTHROCYTE [DISTWIDTH] IN BLOOD BY AUTOMATED COUNT: 14.3 % (ref 10–15)
GLUCOSE SERPL-MCNC: 153 MG/DL (ref 70–99)
GLUCOSE UR STRIP-MCNC: ABNORMAL MG/DL
HCO3 SERPL-SCNC: 19 MMOL/L (ref 22–29)
HCT VFR BLD AUTO: 29.6 % (ref 35–47)
HGB BLD-MCNC: 9.6 G/DL (ref 11.7–15.7)
HGB UR QL STRIP: ABNORMAL
IMM GRANULOCYTES # BLD: 0.1 10E3/UL
IMM GRANULOCYTES NFR BLD: 1 %
INR PPP: 1.4 (ref 0.85–1.15)
KETONES UR STRIP-MCNC: ABNORMAL MG/DL
LEUKOCYTE ESTERASE UR QL STRIP: ABNORMAL
LYMPHOCYTES # BLD AUTO: 0.9 10E3/UL (ref 0.8–5.3)
LYMPHOCYTES NFR BLD AUTO: 8 %
MCH RBC QN AUTO: 28.3 PG (ref 26.5–33)
MCHC RBC AUTO-ENTMCNC: 32.4 G/DL (ref 31.5–36.5)
MCV RBC AUTO: 87 FL (ref 78–100)
MONOCYTES # BLD AUTO: 0.8 10E3/UL (ref 0–1.3)
MONOCYTES NFR BLD AUTO: 7 %
NEUTROPHILS # BLD AUTO: 9.1 10E3/UL (ref 1.6–8.3)
NEUTROPHILS NFR BLD AUTO: 83 %
NITRATE UR QL: ABNORMAL
NRBC # BLD AUTO: 0 10E3/UL
NRBC BLD AUTO-RTO: 0 /100
PH UR STRIP: ABNORMAL [PH]
PLATELET # BLD AUTO: 302 10E3/UL (ref 150–450)
POTASSIUM SERPL-SCNC: 5 MMOL/L (ref 3.4–5.3)
PROT SERPL-MCNC: 7.7 G/DL (ref 6.4–8.3)
PROTHROMBIN TIME: 17.3 SECONDS (ref 11.8–14.8)
RBC # BLD AUTO: 3.39 10E6/UL (ref 3.8–5.2)
RBC URINE: >182 /HPF
SODIUM SERPL-SCNC: 141 MMOL/L (ref 135–145)
SP GR UR STRIP: ABNORMAL
SQUAMOUS EPITHELIAL: 0 /HPF
UROBILINOGEN UR STRIP-MCNC: ABNORMAL MG/DL
WBC # BLD AUTO: 11.1 10E3/UL (ref 4–11)
WBC CLUMPS #/AREA URNS HPF: PRESENT /HPF
WBC URINE: >182 /HPF

## 2025-07-28 PROCEDURE — 99284 EMERGENCY DEPT VISIT MOD MDM: CPT | Mod: 25 | Performed by: EMERGENCY MEDICINE

## 2025-07-28 PROCEDURE — 74176 CT ABD & PELVIS W/O CONTRAST: CPT | Mod: 26 | Performed by: INTERNAL MEDICINE

## 2025-07-28 PROCEDURE — 74176 CT ABD & PELVIS W/O CONTRAST: CPT

## 2025-07-28 PROCEDURE — 36415 COLL VENOUS BLD VENIPUNCTURE: CPT | Performed by: EMERGENCY MEDICINE

## 2025-07-28 PROCEDURE — 81003 URINALYSIS AUTO W/O SCOPE: CPT | Performed by: EMERGENCY MEDICINE

## 2025-07-28 PROCEDURE — 87086 URINE CULTURE/COLONY COUNT: CPT | Performed by: EMERGENCY MEDICINE

## 2025-07-28 PROCEDURE — 82040 ASSAY OF SERUM ALBUMIN: CPT | Performed by: EMERGENCY MEDICINE

## 2025-07-28 PROCEDURE — 85610 PROTHROMBIN TIME: CPT | Performed by: EMERGENCY MEDICINE

## 2025-07-28 PROCEDURE — 85004 AUTOMATED DIFF WBC COUNT: CPT | Performed by: EMERGENCY MEDICINE

## 2025-07-28 PROCEDURE — 99284 EMERGENCY DEPT VISIT MOD MDM: CPT | Performed by: EMERGENCY MEDICINE

## 2025-07-28 RX ORDER — LISINOPRIL 5 MG/1
1 TABLET ORAL
COMMUNITY
Start: 2025-05-07

## 2025-07-28 RX ORDER — ONDANSETRON 4 MG/1
4 TABLET, FILM COATED ORAL
COMMUNITY
Start: 2025-07-22

## 2025-07-28 RX ORDER — GLIPIZIDE 5 MG/1
5 TABLET, FILM COATED, EXTENDED RELEASE ORAL
COMMUNITY
Start: 2024-10-11

## 2025-07-28 RX ORDER — LEVOFLOXACIN 750 MG/1
750 TABLET, FILM COATED ORAL
COMMUNITY
Start: 2025-07-28 | End: 2025-08-04

## 2025-07-28 RX ORDER — POLYETHYLENE GLYCOL 3350 17 G/17G
17 POWDER, FOR SOLUTION ORAL
COMMUNITY
Start: 2025-04-18

## 2025-07-28 ASSESSMENT — COLUMBIA-SUICIDE SEVERITY RATING SCALE - C-SSRS
6. HAVE YOU EVER DONE ANYTHING, STARTED TO DO ANYTHING, OR PREPARED TO DO ANYTHING TO END YOUR LIFE?: NO
1. IN THE PAST MONTH, HAVE YOU WISHED YOU WERE DEAD OR WISHED YOU COULD GO TO SLEEP AND NOT WAKE UP?: NO
2. HAVE YOU ACTUALLY HAD ANY THOUGHTS OF KILLING YOURSELF IN THE PAST MONTH?: NO

## 2025-07-28 ASSESSMENT — ACTIVITIES OF DAILY LIVING (ADL)
ADLS_ACUITY_SCORE: 43

## 2025-07-28 NOTE — ED TRIAGE NOTES
Pt had catheter placed today due to UTI and excess fluid in bladder. Pt came to ER today due to have dark red urine in bag and clots in it. Pt has never had this happen before when having a catheter in past.

## 2025-07-28 NOTE — ED PROVIDER NOTES
History     Chief Complaint   Patient presents with    Catheter Problem     HPI  Laurence Dangelo is a 65 year old female who presents to the ED with gross hematuria post Avalos catheter placement today.  Patient was seen at the [facility] May 14 for bilateral hydroureter nephrosis with urinary retention and acute kidney injury treated with a Avalos catheter.  She had bladder wall edema seen on cystoscopy.  No bladder lesions were seen.  Cervical cancer approximately 20 years ago treated with pelvic radiation.  Patient was seen about 6 days ago and had some symptoms of UTI urinalysis showed large white blood cells, microscopy greater than 100 white blood cells with bacteria and placed on Macrobid no culture found.  She was switched to Levaquin today.  She was found to have urinary retention in the office and Avalos catheter was placed fairly quickly she had 1000 mL in her bag of clear yellow urine.  She then  her bag later this morning at 1300 and another time at 700.  Her bag is almost full now with bloody urine.  She noted when she was getting out of the vehicle she saw some blood go down the tube and now has been having bloody urine.  Had a temp of 99.9 previously no chills sweats, chronic nausea.  6 days ago her BUN was 27 today is 32, 6 days ago her creatinine was 2.62 today is 3.57, her GFR was 20 a week ago now 13 today.  6 days ago her hemoglobin was 9.4 this morning it was 9.2 with 320 platelets and a white blood cell count 11.2.  Prior to catheter placement she was having some lower abdominal discomfort radiating into her flank and hips.  Has not had any increase in peripheral edema.    Allergies:  Allergies   Allergen Reactions    Ciprofloxacin      Rash      Ether      nausea    Ezetimibe Other (See Comments)     Muscle aches and weak    Neomycin-Bacitracin-Polymyxin      Topical neosporin    Rosuvastatin Muscle Pain (Myalgia)     Pravastatin, lovastatin, atorvastatin, simvastatin also troubles        Problem List:    Patient Active Problem List    Diagnosis Date Noted    Pleural effusion on right 04/29/2025     Priority: Medium    Lymphedema of both lower extremities 07/28/2020     Priority: Medium    History of cervical cancer 11/09/2010     Priority: Medium     IMO Update 10/11      Hyperlipidemia 09/18/2008     Priority: Medium     IMO Update 10/11      Esophageal reflux 03/10/2006     Priority: Medium     GERD      Essential hypertension 06/01/2004     Priority: Medium     IMO Update      Lumbago 03/09/2004     Priority: Medium     IMO Update 10/11      Functional heart murmur 07/12/2001     Priority: Medium     Rheumatic heart murmur; rheumatic fever at age 10  IMO Update 10/11      Type 2 diabetes mellitus without complication, without long-term current use of insulin (H) 07/12/2001     Priority: Medium     DM 2 diag. 1990  IMO Update 10/11          Past Medical History:    Past Medical History:   Diagnosis Date    Cervical cancer (H)     Diabetes type 2, controlled (H)     Lymphedema of left leg        Past Surgical History:    History reviewed. No pertinent surgical history.    Family History:    History reviewed. No pertinent family history.    Social History:  Marital Status:   [2]  Social History     Tobacco Use    Smoking status: Never    Smokeless tobacco: Never   Substance Use Topics    Alcohol use: Yes     Comment: rare    Drug use: No        Medications:    glipiZIDE (GLUCOTROL XL) 5 MG 24 hr tablet  levofloxacin (LEVAQUIN) 750 MG tablet  lisinopril (ZESTRIL) 5 MG tablet  metFORMIN (GLUCOPHAGE) 500 MG tablet  METFORMIN HCL PO  multivitamin w/minerals (THERA-VIT-M) tablet  Omega-3 Fatty Acids (OMEGA-3 FISH OIL PO)  ondansetron (ZOFRAN) 4 MG tablet  polyethylene glycol (MIRALAX) 17 GM/Dose powder  ASPIRIN PO          Review of Systems   All other systems reviewed and are negative.      Physical Exam   BP: (!) 155/96  Pulse: 118  Temp: 99.3  F (37.4  C)  Resp: 16  SpO2: 96  %      Physical Exam  Vitals and nursing note reviewed.   Constitutional:       General: She is not in acute distress.     Appearance: Normal appearance. She is not ill-appearing, toxic-appearing or diaphoretic.   HENT:      Head: Normocephalic and atraumatic.      Right Ear: External ear normal.      Left Ear: External ear normal.      Nose: Nose normal.      Mouth/Throat:      Mouth: Mucous membranes are moist.      Pharynx: Oropharynx is clear.   Eyes:      General: No scleral icterus.     Extraocular Movements: Extraocular movements intact.      Conjunctiva/sclera: Conjunctivae normal.   Cardiovascular:      Rate and Rhythm: Regular rhythm. Tachycardia present.      Pulses: Normal pulses.      Heart sounds: Normal heart sounds.   Pulmonary:      Effort: Pulmonary effort is normal. No respiratory distress.      Breath sounds: Normal breath sounds.   Abdominal:      General: Bowel sounds are normal.      Palpations: Abdomen is soft.      Tenderness: There is no abdominal tenderness. There is no guarding or rebound.   Musculoskeletal:         General: Normal range of motion.      Cervical back: Normal range of motion and neck supple.      Right lower leg: Edema present.      Left lower leg: Edema present.   Skin:     General: Skin is warm and dry.      Capillary Refill: Capillary refill takes less than 2 seconds.      Findings: No rash.   Neurological:      General: No focal deficit present.      Mental Status: She is alert and oriented to person, place, and time. Mental status is at baseline.   Psychiatric:         Mood and Affect: Mood normal.         Behavior: Behavior normal.         Thought Content: Thought content normal.         Judgment: Judgment normal.         ED Course     ED Course as of 07/29/25 0435   Mon Jul 28, 2025 2009 I discussed the case with Dr. Shields from the urology department at [facility].  He will set up an outpatient appointment within the next 10 days to two weeks.  Plan to keep the  Avalos in place and continue the antibiotics.       Procedures              Critical Care time:  none     None         Recent Results (from the past 24 hours)   UA with Microscopic reflex to Culture    Specimen: Urine, Avalos Catheter   Result Value Ref Range    Color Urine Red (A) Colorless, Straw, Light Yellow, Yellow    Appearance Urine Cloudy (A) Clear    Glucose Urine      Bilirubin Urine      Ketones Urine      Specific Gravity Urine      Blood Urine      pH Urine      Protein Albumin Urine      Urobilinogen Urine      Nitrite Urine      Leukocyte Esterase Urine      WBC Clumps Urine Present (A) None Seen /HPF    RBC Urine >182 (H) <=2 /HPF    WBC Urine >182 (H) <=5 /HPF    Squamous Epithelials Urine 0 <=1 /HPF    Narrative    Urine Culture ordered based on laboratory criteria   CBC with Platelets and Differential (Limited Occurrences)    Narrative    The following orders were created for panel order CBC with Platelets and Differential (Limited Occurrences).  Procedure                               Abnormality         Status                     ---------                               -----------         ------                     CBC with platelets and ...[[id number]]  Abnormal            Final result                 Please view results for these tests on the individual orders.   INR   Result Value Ref Range    INR 1.40 (H) 0.85 - 1.15    PT 17.3 (H) 11.8 - 14.8 Seconds   Comprehensive Metabolic Panel (Limited Occurrences)   Result Value Ref Range    Sodium 141 135 - 145 mmol/L    Potassium 5.0 3.4 - 5.3 mmol/L    Carbon Dioxide (CO2) 19 (L) 22 - 29 mmol/L    Anion Gap 14 7 - 15 mmol/L    Urea Nitrogen 28.4 (H) 8.0 - 23.0 mg/dL    Creatinine 2.68 (H) 0.51 - 0.95 mg/dL    GFR Estimate 19 (L) >60 mL/min/1.73m2    Calcium 9.7 8.8 - 10.4 mg/dL    Chloride 108 (H) 98 - 107 mmol/L    Glucose 153 (H) 70 - 99 mg/dL    Alkaline Phosphatase 99 40 - 150 U/L    AST 24 0 - 45 U/L    ALT 17 0 - 50 U/L    Protein Total 7.7 6.4  - 8.3 g/dL    Albumin 3.3 (L) 3.5 - 5.2 g/dL    Bilirubin Total 0.4 <=1.2 mg/dL   CBC with platelets and differential   Result Value Ref Range    WBC Count 11.1 (H) 4.0 - 11.0 10e3/uL    RBC Count 3.39 (L) 3.80 - 5.20 10e6/uL    Hemoglobin 9.6 (L) 11.7 - 15.7 g/dL    Hematocrit 29.6 (L) 35.0 - 47.0 %    MCV 87 78 - 100 fL    MCH 28.3 26.5 - 33.0 pg    MCHC 32.4 31.5 - 36.5 g/dL    RDW 14.3 10.0 - 15.0 %    Platelet Count 302 150 - 450 10e3/uL    % Neutrophils 83 %    % Lymphocytes 8 %    % Monocytes 7 %    % Eosinophils 1 %    % Basophils 1 %    % Immature Granulocytes 1 %    NRBCs per 100 WBC 0 <1 /100    Absolute Neutrophils 9.1 (H) 1.6 - 8.3 10e3/uL    Absolute Lymphocytes 0.9 0.8 - 5.3 10e3/uL    Absolute Monocytes 0.8 0.0 - 1.3 10e3/uL    Absolute Eosinophils 0.1 0.0 - 0.7 10e3/uL    Absolute Basophils 0.1 0.0 - 0.2 10e3/uL    Absolute Immature Granulocytes 0.1 <=0.4 10e3/uL    Absolute NRBCs 0.0 10e3/uL   CT Abdomen Pelvis w/o Contrast    Narrative    EXAM: CT ABDOMEN PELVIS W/O CONTRAST  LOCATION: [facility]  DATE: 7/28/2025    INDICATION: hematuria with catheter,  COMPARISON: CT CAP 04/29/2025.  TECHNIQUE: CT scan of the abdomen and pelvis was performed without IV contrast. Multiplanar reformats were obtained. Dose reduction techniques were used.  CONTRAST: None.    FINDINGS:   LOWER CHEST: Partially imaged coronary artery calcifications. Mild atelectasis/scarring of the lung bases.    HEPATOBILIARY: Redemonstrated gallbladder wall calcifications. No biliary ductal dilatation. Unremarkable noncontrast appearance of the liver.    PANCREAS: Normal.    SPLEEN: Normal.    ADRENAL GLANDS: Unchanged subcentimeter right adrenal nodule with attenuation values compatible with adrenal adenoma. Left adrenal is unremarkable.    KIDNEYS/BLADDER: Slightly improved moderate right hydronephrosis. Similar moderate left hydronephrosis. No radiopaque urinary calculus. Mild diffuse urothelial thickening of both  ureters. Tiny locule of gas in the right collecting system is likely   secondary to instrumentation. The urinary bladder is decompressed around a Avalos catheter. No large bladder hematoma is identified.    BOWEL: No obstruction or inflammatory change. Colonic diverticula. Normal appendix.    LYMPH NODES: Mildly enlarged right inguinal lymph nodes are nonspecific but favored reactive.    VASCULATURE: Moderate burden of vascular calcifications without abdominal aortic aneurysm.    PELVIC ORGANS: No pelvic masses. Trace amount of nonspecific free pelvic fluid.    MUSCULOSKELETAL: Degenerative changes of the spine with advanced degenerative disc L4-L5. Grade 1 anterolisthesis of L4 on L5. Advanced degenerative changes of the bilateral hips. Small fat-containing umbilical hernia.      Impression    IMPRESSION:   1.  The urinary bladder is decompressed around a Avalos catheter. No large bladder hematoma is identified.  2.  Slightly improved moderate right hydronephrosis. Similar moderate left hydronephrosis. No radiopaque urinary calculus.  3.  Mild diffuse urothelial thickening of both ureters which is nonspecific but can be seen with urinary tract infection. Recommend correlation with urinalysis.  4.  Redemonstrated gallbladder wall calcifications.           Medications - No data to display    Assessments & Plan (with Medical Decision Making)     I have reviewed the nursing notes.    I have reviewed the findings, diagnosis, plan and need for follow up with the patient.           Medical Decision Making  The patient's presentation was of high complexity (an acute health issue posing potential threat to life or bodily function).    The patient's evaluation involved:  ordering and/or review of 3+ test(s) in this encounter (CT scan multiple lab investigations)    The patient's management necessitated moderate risk (65-year-old female presents with gross hematuria after having Aavlos catheter placed she needed significant  diagnostic evaluations).      Patient transferred at 1900 hrs. to Dr. Hernandes  Discharge Medication List as of 7/28/2025  8:28 PM          Final diagnoses:   Urinary retention   Acute kidney injury   Gross hematuria       7/28/2025   [facility]       Pancho Perera MD  07/28/25 [id number]       Rafael Hernandes MD  07/29/25 2101

## 2025-07-28 NOTE — ED NOTES
Face to face report given with opportunity to observe patient.    Report given to Deanna Leone RN   7/28/2025  6:53 PM

## 2025-07-29 NOTE — PROGRESS NOTES
Care was signed out through by Dr. Perera at shift change.  Disposition pending: Discussion with urology regarding follow-up.          ED Course as of 07/28/25 2026 Mon Jul 28, 2025 2009 I discussed the case with Dr. Shields from the urology department at [facility].  He will set up an outpatient appointment within the next 10 days to two weeks.  Plan to keep the Avalos in place and continue the antibiotics.

## 2025-07-31 LAB — BACTERIA UR CULT: ABNORMAL
